# Patient Record
Sex: FEMALE | Race: WHITE | NOT HISPANIC OR LATINO | Employment: FULL TIME | URBAN - METROPOLITAN AREA
[De-identification: names, ages, dates, MRNs, and addresses within clinical notes are randomized per-mention and may not be internally consistent; named-entity substitution may affect disease eponyms.]

---

## 2017-01-20 ENCOUNTER — ALLSCRIPTS OFFICE VISIT (OUTPATIENT)
Dept: OTHER | Facility: OTHER | Age: 42
End: 2017-01-20

## 2018-01-06 ENCOUNTER — OFFICE VISIT (OUTPATIENT)
Dept: URGENT CARE | Facility: CLINIC | Age: 43
End: 2018-01-06
Payer: COMMERCIAL

## 2018-01-06 PROCEDURE — G0381 LEV 2 HOSP TYPE B ED VISIT: HCPCS

## 2018-01-07 NOTE — PROGRESS NOTES
Assessment   1  Low back pain (724 2) (M54 5)   2  Lumbar sprain, initial encounter (847 2) (S33 5XXA)    Plan   Lumbar sprain, initial encounter    · Celecoxib 200 MG Oral Capsule; TAKE 1 CAPSULE TWICE DAILY WITH FOOD   · Cyclobenzaprine HCl - 5 MG Oral Tablet; Take 1 tablet 3 times daily as needed   · Follow-up PRN Evaluation and Treatment  Follow-up  Status: Complete  Done:    18CXH0343 12:02PM   · Some eating tips that can help you lose weight ; Status:Complete;   Done: 73HWC8953    12:02PM   · We suggest that you use a lumbar support cushion when sitting or driving ;    Status:Complete;   Done: 84FUS3651 12:02PM   · You may slowly resume your normal level of activity once you feel better ;    Status:Complete;   Done: 21HFC6112 12:02PM    Discussion/Summary   Discussion Summary:    I recommend resting the back  Warm compresses  Mild home stretching exercises  Follow-up if no better in 3-5 days  Medication Side Effects Reviewed: Possible side effects of new medications were reviewed with the patient/guardian today  Counseling Documentation With Imm: The patient was counseled regarding instructions for management,-- impressions  Chief Complaint   1  Back Pain  Chief Complaint Free Text Note Form: Patient relates started with back pain x2 weeks ago after laying down on floor  Denies falling  Denies fever  Denies urinary symptoms  Today took Aleve 2 tabs at 9:30 am       History of Present Illness   HPI: Complains of significant low back pain in both sides  She said she had been exercising a week ago or so in injured her back but was getting better  This morning she woke up without pain stepped out of the shower and had a lot of pain in her low back  pain is both sides of her low back nonradiating  Hospital Based Practices Required Assessment: Reason DV Screen not done: family at bedside       Depression And Suicide Screen  Reason suicide screen not done: family at bedside        Prefered Language is english  Primary Language is  english  Review of Systems   Focused-Female:      Constitutional: No fever, no chills, feels well, no tiredness, no recent weight gain or loss  ENT: no ear ache, no loss of hearing, no nosebleeds or nasal discharge, no sore throat or hoarseness  Cardiovascular: no complaints of slow or fast heart rate, no chest pain, no palpitations, no leg claudication or lower extremity edema  Respiratory: no complaints of shortness of breath, no wheezing, no dyspnea on exertion, no orthopnea or PND  Gastrointestinal: no complaints of abdominal pain, no constipation, no nausea or diarrhea, no vomiting, no bloody stools  Musculoskeletal: as noted in HPI  Active Problems   1  Common migraine without aura (346 10) (G43 009)   2  Low back pain (724 2) (M54 5)   3  Migraine headache (346 90) (G43 909)   4  Obesity (278 00) (E66 9)    Past Medical History   1  History of headache (V13 89) (Z87 898)   2  History of migraine (V12 49) (Z86 69)  Active Problems And Past Medical History Reviewed: The active problems and past medical history were reviewed and updated today  Family History   Mother    1  Family history of Heart Disease (V17 49)   2  Family history of Hypertension (V17 49)  Father    3  Family history of Laryngeal Cancer (V16 2)  Family History    4  Family history of Cancer   5  Family history of Diabetes Mellitus (V18 0)    Social History    · Denied: History of Being A Social Drinker   · Caffeine Use   · Daily Coffee Consumption (1  Cups/Day)   · Denied: History of Drug Use   · Never A Smoker  Social History Reviewed: The social history was reviewed and updated today  Surgical History   1  History of Appendectomy   2  History of Sinus Surgery    Current Meds    1  SUMAtriptan Succinate 100 MG Oral Tablet; TAKE 1 TABLET AT ONSET OF MIGRAINE     HEADACHE  MAY REPEAT IN 2 HOURS IFNEEDED;      Therapy: 78XAQ5780 to (Evaluate:14Jan2018)  Requested for: 59RSZ0032; Last     Rx:16Oct2017 Ordered   2  Topiramate 100 MG Oral Tablet; take 2 tablets at bedtime; Therapy: 41BMG8694 to (Evaluate:16Nov2017)  Requested for: 20Jan2017; Last     Rx:20Jan2017 Ordered  Medication List Reviewed: The medication list was reviewed and updated today  Allergies   1  Penicillins    Vitals   Signs   Recorded: 61QJW3090 11:36AM   Temperature: 98 9 F, Tympanic  Heart Rate: 93  Respiration: 18  Systolic: 981, RUE, Sitting  Diastolic: 89, RUE, Sitting  Height: 5 ft 7 in  O2 Saturation: 98, RA  LMP: 21KQE7322  Pain Scale: 6    Physical Exam     Exam shows paravertebral muscle spasm bilateral low back  She has a fair range of motion neurovascular is intact  Straight leg raising to about 45Â° left 30Â° right  Leg lengths are equal   Muscle strength is normal          Constitutional      General appearance: No acute distress, well appearing and well nourished  Eyes      Conjunctiva and lids: No swelling, erythema or discharge  Pupils and irises: Equal, round and reactive to light  Ears, Nose, Mouth, and Throat      External inspection of ears and nose: Normal        Otoscopic examination: Tympanic membranes translucent with normal light reflex  Canals patent without erythema  Nasal mucosa, septum, and turbinates: Normal without edema or erythema  Oropharynx: Normal with no erythema, edema, exudate or lesions  Pulmonary      Respiratory effort: No increased work of breathing or signs of respiratory distress  Auscultation of lungs: Clear to auscultation  Cardiovascular      Palpation of heart: Normal PMI, no thrills  Auscultation of heart: Normal rate and rhythm, normal S1 and S2, without murmurs  Examination of extremities for edema and/or varicosities: Normal        Abdomen      Abdomen: Non-tender, no masses  Liver and spleen: No hepatomegaly or splenomegaly         Lymphatic      Palpation of lymph nodes in neck: No lymphadenopathy  Musculoskeletal      Gait and station: Normal        Digits and nails: Normal without clubbing or cyanosis  Inspection/palpation of joints, bones, and muscles: Normal        Skin      Skin and subcutaneous tissue: Normal without rashes or lesions  Neurologic      Cranial nerves: Cranial nerves 2-12 intact  Reflexes: 2+ and symmetric  Sensation: No sensory loss         Psychiatric      Orientation to person, place, and time: Normal        Mood and affect: Normal        Signatures    Electronically signed by : Idalia Cabezas DO; Jan 6 2018 12:03PM EST                       (Author)

## 2018-01-14 VITALS
HEART RATE: 82 BPM | DIASTOLIC BLOOD PRESSURE: 84 MMHG | RESPIRATION RATE: 16 BRPM | SYSTOLIC BLOOD PRESSURE: 129 MMHG | WEIGHT: 227.5 LBS | BODY MASS INDEX: 35.63 KG/M2

## 2018-01-14 NOTE — PROGRESS NOTES
Assessment    1  Common migraine without aura (346 10) (G43 009)   2  Obesity (278 00) (E66 9)    Plan  Common migraine without aura, Obesity    · Topiramate 100 MG Oral Tablet (Topamax); TAKE 1 TABLET AT BEDTIME   Rx By: Kayla Dennis; Dispense: 30 Days ; #:30 Tablet; Refill: 11; For: Common migraine without aura, Obesity; DARY = N; Verified Transmission to [id number] Last Updated By: System, [employer]; 1/22/2016 12:05:52 PM  Obesity    · Follow-up visit in 1 year Evaluation and Treatment  Follow-up  Status: Hold For -  Scheduling  Requested for: 46BHP3577   Ordered; For: Obesity; Ordered By: Kayla Dennis Performed:  Due: 24APR9111    Discussion/Summary  Discussion Summary:   Preventive:   - Will increase up to 100 mg  Abortive:   - Aleve as needed  Medication Side Effects Reviewed: Possible side effects of new medications were reviewed with the patient/guardian today  Patient Guardian understands agrees: The treatment plan was reviewed with the patient/guardian  The patient/guardian understands and agrees with the treatment plan      Chief Complaint  Chief Complaint Free Text Note Form: Pt is here for a f/u      History of Present Illness  HPI: We had the pleasure of evaluating Scott Dominguez in neurological follow up today  As you know she is a 47year old right handed female  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  What medications do you take or have you taken for your headaches? PREVENTIVE: Topamax  ABORTIVE: Imitrex  Headache trigger: Fatigue, Stress/Tension, Menstruation, Alcohol, missing meals, chewing,     How often do the headaches occur - rarely gets headaches now and exercising tends to help her significantly    What time of the day do the headaches start - Varies   How long do the headaches last - migraines- 1-2 days; mild headaches- 2hr with medication  Where are they located - Frontal, bilateral temples  What is the intensity of pain - migraines- up to 10/10; mild headaches- average pain level 4/10, up to 6/10  Any warning prior to headache and how long do they last - none  Describe your usual headache - migraines- Throbbing, Pounding; mild headaches- dull  Associated symptoms: photophobia, phonophobia, nausea, light-headed or dizzy, prefer to be alone and in a dark room, unable to work at time but had to leave work early  Headache are worse if the patient: bending over       Review of Systems  Neurological ROS:   Constitutional: recent weight gain  HEENT: sinus problems  Cardiovascular:  no chest pain or pressure, no palpitations present, the heart rate was not rapid or irregular, no swelling in the arms or legs, no poor circulation  Respiratory:  no unusual or persistant cough, no shortness of breath with or without exertion  Gastrointestinal:  no nausea, no vomiting, no diarrhea, no abdominal pain, no changes in bowel habits, no melena, no loss of bowel control  Genitourinary:  no incontinence, no feelings of urinary urgency, no increase in frequency, no urinary hesitancy, no dysuria, no hematuria  Musculoskeletal:  no arthralgias, no myalgias, no immobility or loss of function, no head/neck/back pain, no pain while walking  Integumentary  no masses, no rash, no skin lesions, no livedo reticularis  Psychiatric:  no anxiety, no depression, no mood swings, no psychiatric hospitalizations, no sleep problems  Endocrine  no unusual weight loss or gain, no excessive urination, no excessive thirst, no hair loss or gain, no hot or cold intolerance, no menstrual period change or irregularity, no loss of sexual ability or drive, no erection difficulty, no nipple discharge  Hematologic/Lymphatic:  no unusual bleeding, no tendency for easy bruising, no clotting skin or lumps     Neurological General:  no headache, no nausea or vomiting, no lightheadedness, no convulsions, no blackouts, no syncope, no trauma, no photopsia, no increased sleepiness, no trouble falling asleep, no snoring, no awakening at night  Neurological Mental Status:  no confusion, no mood swings, no alteration or loss of consciousness, no difficulty expressing/understanding speech, no memory problems  Neurological Cranial Nerves:  no blurry or double vision, no loss of vision, no face drooping, no facial numbness or weakness, no taste or smell loss/changes, no hearing loss or ringing, no vertigo or dizziness, no dysphagia, no slurred speech  Neurological Motor findings include:  no tremor, no twitching, no cramping(pre/post exercise), no atrophy  Neurological Coordination:  no unsteadiness, no vertigo or dizziness, no clumsiness, no problems reaching for objects  Neurological Sensory:  no numbness, no pain, no tingling, does not fall when eyes closed or taking a shower  Neurological Gait:  no difficulty walking, not falling to one side, no sensation of being pushed, has not had falls  Active Problems    1  Common migraine without aura (346 10) (G43 009)   2  Migraine headache (346 90) (G43 909)    Past Medical History    1  History of headache (V13 89) (Z87 898)   2  History of migraine (V12 49) (Z86 69)    Surgical History    1  History of Appendectomy   2  History of Sinus Surgery    Family History    1  Family history of Heart Disease (V17 49)   2  Family history of Hypertension (V17 49)    3  Family history of Laryngeal Cancer (V16 2)    4  Family history of Cancer   5  Family history of Diabetes Mellitus (V18 0)    Social History    · Denied: History of Being A Social Drinker   · Caffeine Use   · Daily Coffee Consumption (1  Cups/Day)   · Denied: History of Drug Use   · Never A Smoker    Current Meds   1  Magnesium Aspartate 65 MG Oral Tablet; Therapy: (Recorded:49Rfr9191) to Recorded   2  SUMAtriptan Succinate 100 MG Oral Tablet; TAKE 1 TABLET AT ONSET OF MIGRAINE   HEADACHE  MAY REPEAT IN 2 HOURS IF NEEDED;    Therapy: 55Swb4051 to (Evaluate:45Tzr2818) Requested for: 65ASN3492; Last   Rx:34Igu2702 Ordered   3  Topiramate 25 MG Oral Tablet; TAKE 1 TABLET BY MOUTH AT BEDTIME FOR 5 DAYS   THEN INCREASE TO TAKE 2 TABLETS AT BEDTIME THERE AFTER; Therapy: 30CNQ6086 to (Evaluate:10Mar2015)  Requested for: 51AIB1571; Last   Rx:10Nov2014 Ordered    Allergies    1  Penicillins    Vitals  Signs [Data Includes: Current Encounter]   Recorded: 94GYQ0883 11:26AM   Heart Rate: 80  Systolic: 511  Diastolic: 84  Weight: 373 lb   BMI Calculated: 35 24  BSA Calculated: 2 13  O2 Saturation: 99    Physical Exam    Constitutional   General appearance: No acute distress, well appearing and well nourished  Musculoskeletal   Gait and station: Normal gait, stance and balance  Muscle strength: Normal strength throughout  Muscle tone: No atrophy, abnormal movements, flaccidity, cogwheeling or spasticity      Neurologic   2nd cranial nerve: Normal     3rd, 4th, and 6th cranial nerves: Normal     5th cranial nerve: Normal     7th cranial nerve: Normal     8th cranial nerve: Normal     9th cranial nerve: Normal     11th cranial nerve: Normal     12th cranial nerve: Normal     Sensation: Normal     Reflexes: Normal     Coordination: Normal        Signatures   Electronically signed by : Shauna Hernandez MD; Jan 22 2016 12:12PM EST                       (Author)

## 2018-01-23 VITALS
HEIGHT: 67 IN | SYSTOLIC BLOOD PRESSURE: 150 MMHG | RESPIRATION RATE: 18 BRPM | DIASTOLIC BLOOD PRESSURE: 89 MMHG | OXYGEN SATURATION: 98 % | TEMPERATURE: 98.9 F | HEART RATE: 93 BPM

## 2019-07-08 RX ORDER — SUMATRIPTAN 100 MG/1
TABLET, FILM COATED ORAL
Qty: 12 TABLET | Refills: 2 | OUTPATIENT
Start: 2019-07-08

## 2019-07-15 ENCOUNTER — TELEPHONE (OUTPATIENT)
Dept: NEUROLOGY | Facility: CLINIC | Age: 44
End: 2019-07-15

## 2019-07-15 DIAGNOSIS — G43.009 MIGRAINE WITHOUT AURA AND WITHOUT STATUS MIGRAINOSUS, NOT INTRACTABLE: Primary | ICD-10-CM

## 2019-07-15 RX ORDER — SUMATRIPTAN 100 MG/1
1 TABLET, FILM COATED ORAL
COMMUNITY
Start: 2014-02-20 | End: 2019-07-15 | Stop reason: SDUPTHER

## 2019-07-15 RX ORDER — SUMATRIPTAN 100 MG/1
100 TABLET, FILM COATED ORAL AS NEEDED
Qty: 9 TABLET | Refills: 0 | Status: SHIPPED | OUTPATIENT
Start: 2019-07-15 | End: 2019-12-09 | Stop reason: SDUPTHER

## 2019-07-17 NOTE — TELEPHONE ENCOUNTER
Left voicemail for patient requesting call back ASAP to schedule follow  Did state in voicemail that will not initiate refill if no follow up appt

## 2019-11-19 ENCOUNTER — TELEPHONE (OUTPATIENT)
Dept: NEUROLOGY | Facility: CLINIC | Age: 44
End: 2019-11-19

## 2019-12-09 DIAGNOSIS — G43.009 MIGRAINE WITHOUT AURA AND WITHOUT STATUS MIGRAINOSUS, NOT INTRACTABLE: ICD-10-CM

## 2019-12-09 RX ORDER — SUMATRIPTAN 100 MG/1
100 TABLET, FILM COATED ORAL AS NEEDED
Qty: 9 TABLET | Refills: 0 | Status: SHIPPED | OUTPATIENT
Start: 2019-12-09

## 2019-12-10 DIAGNOSIS — G43.009 MIGRAINE WITHOUT AURA AND WITHOUT STATUS MIGRAINOSUS, NOT INTRACTABLE: ICD-10-CM

## 2019-12-10 RX ORDER — SUMATRIPTAN 100 MG/1
100 TABLET, FILM COATED ORAL AS NEEDED
Qty: 9 TABLET | Refills: 0 | Status: CANCELLED | OUTPATIENT
Start: 2019-12-10

## 2020-03-27 ENCOUNTER — TELEPHONE (OUTPATIENT)
Dept: NEUROLOGY | Facility: CLINIC | Age: 45
End: 2020-03-27

## 2020-03-27 NOTE — TELEPHONE ENCOUNTER
Call received from [facility]  Requesting physician information for appt 3/31/20 and required referral  She will call insurance to see what further is needed as provider is currently coming up as "out of network"

## 2021-02-14 ENCOUNTER — NURSE TRIAGE (OUTPATIENT)
Dept: OTHER | Facility: OTHER | Age: 46
End: 2021-02-14

## 2021-02-14 DIAGNOSIS — Z20.828 SARS-ASSOCIATED CORONAVIRUS EXPOSURE: ICD-10-CM

## 2021-02-14 DIAGNOSIS — Z20.828 SARS-ASSOCIATED CORONAVIRUS EXPOSURE: Primary | ICD-10-CM

## 2021-02-14 PROCEDURE — U0003 INFECTIOUS AGENT DETECTION BY NUCLEIC ACID (DNA OR RNA); SEVERE ACUTE RESPIRATORY SYNDROME CORONAVIRUS 2 (SARS-COV-2) (CORONAVIRUS DISEASE [COVID-19]), AMPLIFIED PROBE TECHNIQUE, MAKING USE OF HIGH THROUGHPUT TECHNOLOGIES AS DESCRIBED BY CMS-2020-01-R: HCPCS | Performed by: FAMILY MEDICINE

## 2021-02-14 PROCEDURE — U0005 INFEC AGEN DETEC AMPLI PROBE: HCPCS | Performed by: FAMILY MEDICINE

## 2021-02-14 NOTE — TELEPHONE ENCOUNTER
Reason for Disposition   [1] COVID-19 infection suspected by caller or triager AND [2] mild symptoms (cough, fever, or others) AND [7] no complications or SOB    Answer Assessment - Initial Assessment Questions  1  Were you within 6 feet or less, for up to 15 minutes or more with a person that has a confirmed COVID-19 test?   Yes    2  What was the date of your exposure? 1/7    3  Are you experiencing any symptoms attributed to the virus? (Assess for SOB, cough, fever, difficulty breathing)   Headache, cough, body aches, fatigue, runny nose, and fever  Temp-99 2 (oral)    4  HIGH RISK: Do you have any history heart or lung conditions, weakened immune system, diabetes, Asthma, CHF, HIV, COPD, Chemo, renal failure, sickle cell, etc?  Denies    5  PREGNANCY: Are you pregnant or did you recently give birth? Denies    Protocols used: CORONAVIRUS (COVID-19) DIAGNOSED OR SUSPECTED-ADULT-    Pt has an out of network PCP

## 2021-02-14 NOTE — TELEPHONE ENCOUNTER
Regarding: covid test request- symptomatic- cough  ----- Message from [facility] sent at 2/14/2021  9:58 AM EST -----  "I have a fever of 99 2 (oral), cough, headache, body aches, fatigue, runny nose   I have been in contact with a covid positive patient "

## 2021-02-15 LAB — SARS-COV-2 RNA RESP QL NAA+PROBE: POSITIVE

## 2021-02-16 ENCOUNTER — TELEPHONE (OUTPATIENT)
Dept: OTHER | Facility: OTHER | Age: 46
End: 2021-02-16

## 2021-02-16 NOTE — TELEPHONE ENCOUNTER
Your test for the novel coronavirus, also known as COVID-19, was positive  The sample showed that the virus was present  Positive COVID-19 test results are reportable to the [place] Department of Health  You may receive a call from trained public health staff to conduct an interview  It is important to answer their call  They will ask you to verify who you are  During the call they will ask you about what symptoms you have, what you did before you got sick, and who you were close to while sick  The health department does this to make sure everyone stays healthy and to reduce the spread of the virus  If you would like to verify if the caller does in fact work in contact tracing, call the [address] [place] [address] at [employer] ([phone])  For additional information, please visit the [facility] website: www health [email or web address] [email or web address]     If you have any additional questions, we can schedule a virtual visit for you with a provider or call the [facility] [phone], option [phone], for care advice    For additional information, please visit the Coronavirus FAQ on the [facility] home page ([email or web address])  Pt has an out of network PCP for follow up

## 2021-04-06 DIAGNOSIS — Z23 ENCOUNTER FOR IMMUNIZATION: ICD-10-CM

## 2021-04-19 ENCOUNTER — IMMUNIZATIONS (OUTPATIENT)
Dept: FAMILY MEDICINE CLINIC | Facility: HOSPITAL | Age: 46
End: 2021-04-19
Attending: INTERNAL MEDICINE

## 2021-04-19 DIAGNOSIS — Z23 ENCOUNTER FOR IMMUNIZATION: Primary | ICD-10-CM

## 2021-04-19 PROCEDURE — 91300 SARS-COV-2 / COVID-19 MRNA VACCINE (PFIZER-BIONTECH) 30 MCG: CPT

## 2021-04-19 PROCEDURE — 0001A SARS-COV-2 / COVID-19 MRNA VACCINE (PFIZER-BIONTECH) 30 MCG: CPT

## 2021-05-11 ENCOUNTER — IMMUNIZATIONS (OUTPATIENT)
Dept: FAMILY MEDICINE CLINIC | Facility: HOSPITAL | Age: 46
End: 2021-05-11

## 2021-05-11 DIAGNOSIS — Z23 ENCOUNTER FOR IMMUNIZATION: Primary | ICD-10-CM

## 2021-05-11 PROCEDURE — 0002A SARS-COV-2 / COVID-19 MRNA VACCINE (PFIZER-BIONTECH) 30 MCG: CPT

## 2021-05-11 PROCEDURE — 91300 SARS-COV-2 / COVID-19 MRNA VACCINE (PFIZER-BIONTECH) 30 MCG: CPT

## 2024-05-21 ENCOUNTER — OFFICE VISIT (OUTPATIENT)
Dept: URGENT CARE | Facility: CLINIC | Age: 49
End: 2024-05-21
Payer: COMMERCIAL

## 2024-05-21 VITALS
HEIGHT: 66 IN | TEMPERATURE: 97 F | SYSTOLIC BLOOD PRESSURE: 139 MMHG | RESPIRATION RATE: 18 BRPM | OXYGEN SATURATION: 96 % | HEART RATE: 81 BPM | WEIGHT: 216.4 LBS | BODY MASS INDEX: 34.78 KG/M2 | DIASTOLIC BLOOD PRESSURE: 88 MMHG
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
## 2024-05-21 PROCEDURE — 99213 OFFICE O/P EST LOW 20 MIN: CPT

## 2024-05-21 NOTE — PROGRESS NOTES
[facility]        NAME: Desiree Jones is a 48 y.o. female  : 1975    MRN: [id number]  DATE: May 21, 2024  TIME: 7:30 PM    Assessment and Plan   Bug bite, initial encounter [W57.XXXA]  1. Bug bite, initial encounter        Benadryl cream and hydrocortisone cream recommended. Discussed ED and return precautions with patient. Discussed that rash around bug bite does not appear to be a bullseye. Patient agreeable and understanding.   Patient Instructions     Follow up with PCP in 3-5 days.  Proceed to ER if symptoms worsen.    If tests have been performed at [facility], our office will contact you with results if changes need to be made to the care plan discussed with you at the visit.  You can review your full results on [facility].    Chief Complaint     Chief Complaint   Patient presents with    Rash     Rash to right arm and neck since       History of Present Illness       Patient is a 48-year-old female presenting complaining of bug bites x 3 days.  She states it started on  and has been itching and becoming more swollen.   She has been using hydrocortisone cream which helps the itching.   She notes she had it on her stomach before and it self resolved.  She denies any tick exposures that she knows of. States she was mulching this past weekend when she got the bites.    Rash  Pertinent negatives include no cough, fatigue, fever, shortness of breath or vomiting.       Review of Systems   Review of Systems   Constitutional:  Negative for chills, fatigue and fever.   HENT: Negative.     Respiratory:  Negative for cough, shortness of breath, wheezing and stridor.    Gastrointestinal:  Negative for abdominal pain, nausea and vomiting.   Genitourinary: Negative.    Musculoskeletal: Negative.  Negative for arthralgias and myalgias.   Skin:  Positive for rash.   Neurological:  Negative for seizures and syncope.         Current Medications       Current Outpatient Medications:      "losartan (COZAAR) 25 mg tablet, Take 50 mg by mouth daily, Disp: , Rfl:     SUMAtriptan (IMITREX) 100 mg tablet, Take 1 tablet (100 mg total) by mouth as needed for migraine, Disp: 9 tablet, Rfl: 0    topiramate (TOPAMAX) 100 mg tablet, Take 50 mg by mouth daily at bedtime (Patient not taking: Reported on 5/21/2024), Disp: , Rfl:     Current Allergies     Allergies as of 05/21/2024 - Reviewed 05/21/2024   Allergen Reaction Noted    Penicillins Edema 02/20/2014            The following portions of the patient's history were reviewed and updated as appropriate: allergies, current medications, past family history, past medical history, past social history, past surgical history and problem list.     History reviewed. No pertinent past medical history.    History reviewed. No pertinent surgical history.    History reviewed. No pertinent family history.      Medications have been verified.        Objective   /88   Pulse 81   Temp (!) 97 °F (36.1 °C) (Temporal)   Resp 18   Ht 5' 6\" (1.676 m)   Wt 98.2 kg (216 lb 6.4 oz)   SpO2 96%   BMI 34.93 kg/m²   No LMP recorded.       Physical Exam     Physical Exam  Vitals and nursing note reviewed.   Constitutional:       General: She is not in acute distress.     Appearance: Normal appearance. She is normal weight. She is not ill-appearing, toxic-appearing or diaphoretic.   HENT:      Head: Normocephalic and atraumatic.      Right Ear: External ear normal.      Left Ear: External ear normal.      Nose: Nose normal.      Mouth/Throat:      Mouth: Mucous membranes are moist.   Eyes:      Conjunctiva/sclera: Conjunctivae normal.   Cardiovascular:      Rate and Rhythm: Normal rate.      Pulses: Normal pulses.   Pulmonary:      Effort: Pulmonary effort is normal.      Breath sounds: Normal breath sounds.   Musculoskeletal:         General: No swelling or deformity. Normal range of motion.      Cervical back: Normal range of motion.   Skin:     General: Skin is warm and " dry.      Capillary Refill: Capillary refill takes less than 2 seconds.      Findings: Lesion (2cm erythematous papule on anterior right upper arm with surrounding 7cm of erythema) present.      Comments: 2 small 1cm papules on right side of neck     Neurological:      General: No focal deficit present.      Mental Status: She is alert. Mental status is at baseline.   Psychiatric:         Mood and Affect: Mood normal.         Behavior: Behavior normal.

## 2024-05-26 ENCOUNTER — OFFICE VISIT (OUTPATIENT)
Dept: URGENT CARE | Facility: CLINIC | Age: 49
End: 2024-05-26
Payer: COMMERCIAL

## 2024-05-26 ENCOUNTER — APPOINTMENT (OUTPATIENT)
Dept: URGENT CARE | Facility: CLINIC | Age: 49
End: 2024-05-26
Payer: COMMERCIAL

## 2024-05-26 ENCOUNTER — APPOINTMENT (OUTPATIENT)
Dept: RADIOLOGY | Facility: CLINIC | Age: 49
End: 2024-05-26
Payer: COMMERCIAL

## 2024-05-26 VITALS
TEMPERATURE: 97.4 F | DIASTOLIC BLOOD PRESSURE: 63 MMHG | BODY MASS INDEX: 32.95 KG/M2 | WEIGHT: 205 LBS | HEART RATE: 76 BPM | RESPIRATION RATE: 20 BRPM | HEIGHT: 66 IN | SYSTOLIC BLOOD PRESSURE: 114 MMHG | OXYGEN SATURATION: 97 %

## 2024-05-26 DIAGNOSIS — J40 BRONCHITIS: ICD-10-CM

## 2024-05-26 DIAGNOSIS — J40 BRONCHITIS: Primary | ICD-10-CM

## 2024-05-26 DIAGNOSIS — M94.0 COSTOCHONDRITIS: ICD-10-CM

## 2024-05-26 LAB
ATRIAL RATE: 69 BPM
P AXIS: 59 DEGREES
PR INTERVAL: 152 MS
QRS AXIS: 38 DEGREES
QRSD INTERVAL: 94 MS
QT INTERVAL: 372 MS
QTC INTERVAL: 398 MS
T WAVE AXIS: 11 DEGREES
VENTRICULAR RATE: 69 BPM

## 2024-05-26 PROCEDURE — 99213 OFFICE O/P EST LOW 20 MIN: CPT | Performed by: PHYSICIAN ASSISTANT

## 2024-05-26 PROCEDURE — 71046 X-RAY EXAM CHEST 2 VIEWS: CPT

## 2024-05-26 PROCEDURE — 93005 ELECTROCARDIOGRAM TRACING: CPT | Performed by: PHYSICIAN ASSISTANT

## 2024-05-26 PROCEDURE — 93010 ELECTROCARDIOGRAM REPORT: CPT | Performed by: STUDENT IN AN ORGANIZED HEALTH CARE EDUCATION/TRAINING PROGRAM

## 2024-05-26 RX ORDER — PREDNISONE 10 MG/1
10 TABLET ORAL DAILY
Qty: 21 TABLET | Refills: 0 | Status: SHIPPED | OUTPATIENT
Start: 2024-05-26

## 2024-05-26 RX ORDER — AZITHROMYCIN 250 MG/1
TABLET, FILM COATED ORAL
Qty: 6 TABLET | Refills: 0 | Status: SHIPPED | OUTPATIENT
Start: 2024-05-26 | End: 2024-05-30

## 2024-05-26 NOTE — PROGRESS NOTES
[facility]        NAME: Desiree Jones is a 48 y.o. female  : 1975    MRN: [id number]  DATE: May 26, 2024  TIME: 11:53 AM    Assessment and Plan   Bronchitis [J40]  1. Bronchitis  XR chest pa & lateral    ECG 12 lead    predniSONE 10 mg tablet    azithromycin (ZITHROMAX) 250 mg tablet      2. Costochondritis  predniSONE 10 mg tablet            Patient Instructions   Meds as prescribed.  Strict instruction to ER if they worsen.    Follow up with PCP in 3-5 days.  Proceed to  ER if symptoms worsen.    If tests have been performed at [facility], our office will contact you with results if changes need to be made to the care plan discussed with you at the visit.  [facility]    Chief Complaint     Chief Complaint   Patient presents with    Chest Pain     Started 15-20 mins ago, was cleaning then sat down, felt pain/pressure in sternum. Has been coughing for 1 month.          History of Present Illness       Patient is a 48-year-old female with significant past medical history of hypertension presents the office complaining of sudden onset of sternal chest pain described as 6 out of 10 tight and pressure.  States she was cleaning and sat down to rest when the pain started.  States she has been coughing for 1.5 months.  Denies associated fevers, chills, congestion, shortness of breath, wheezing, nausea, vomiting, jaw pain, left arm pain, numbness and tingling, or change in bowel habits.  She was seen for cough 2 weeks ago and given prednisone and cough medicine which helped mildly.  Patient has family history of early MI.        Review of Systems   Review of Systems   Constitutional:  Negative for chills and fever.   HENT:  Negative for congestion and sore throat.    Eyes:  Negative for photophobia and visual disturbance.   Respiratory:  Positive for cough and chest tightness. Negative for shortness of breath and wheezing.    Cardiovascular:  Positive for  "chest pain. Negative for palpitations and leg swelling.   Gastrointestinal:  Negative for abdominal pain, diarrhea, nausea and vomiting.   Neurological:  Positive for light-headedness. Negative for dizziness, syncope, numbness and headaches.         Current Medications       Current Outpatient Medications:     azithromycin (ZITHROMAX) 250 mg tablet, Take 2 tablets today then 1 tablet daily x 4 days, Disp: 6 tablet, Rfl: 0    losartan (COZAAR) 25 mg tablet, Take 50 mg by mouth daily, Disp: , Rfl:     predniSONE 10 mg tablet, Take 1 tablet (10 mg total) by mouth daily Take 6 on day 1, take 5 on day 2, take 4 on day 3, take 3 on day 4, take 2 on day 5, take 1 on day 6., Disp: 21 tablet, Rfl: 0    SUMAtriptan (IMITREX) 100 mg tablet, Take 1 tablet (100 mg total) by mouth as needed for migraine, Disp: 9 tablet, Rfl: 0    topiramate (TOPAMAX) 100 mg tablet, Take 50 mg by mouth daily at bedtime (Patient not taking: Reported on 5/21/2024), Disp: , Rfl:     Current Allergies     Allergies as of 05/26/2024 - Reviewed 05/26/2024   Allergen Reaction Noted    Penicillins Edema 02/20/2014            The following portions of the patient's history were reviewed and updated as appropriate: allergies, current medications, past family history, past medical history, past social history, past surgical history and problem list.     Past Medical History:   Diagnosis Date    HTN (hypertension)     Migraines        Past Surgical History:   Procedure Laterality Date    ADENOIDECTOMY         No family history on file.      Medications have been verified.        Objective   /63   Pulse 76   Temp (!) 97.4 °F (36.3 °C)   Resp 20   Ht 5' 6\" (1.676 m)   Wt 93 kg (205 lb)   LMP 05/15/2024   SpO2 97%   BMI 33.09 kg/m²   Patient's last menstrual period was 05/15/2024.       Physical Exam     Physical Exam  Vitals and nursing note reviewed.   Constitutional:       Appearance: Normal appearance. She is well-developed.   HENT:      Head: " Normocephalic and atraumatic.      Right Ear: Tympanic membrane, ear canal and external ear normal.      Left Ear: Tympanic membrane, ear canal and external ear normal.      Nose: Nose normal.      Mouth/Throat:      Pharynx: Uvula midline.   Eyes:      General: Lids are normal.      Conjunctiva/sclera: Conjunctivae normal.      Pupils: Pupils are equal, round, and reactive to light.   Cardiovascular:      Rate and Rhythm: Normal rate and regular rhythm.      Pulses: Normal pulses.      Heart sounds: Normal heart sounds. No murmur heard.     No friction rub. No gallop.   Pulmonary:      Effort: Pulmonary effort is normal.      Breath sounds: Normal breath sounds. No wheezing, rhonchi or rales.   Chest:      Chest wall: Tenderness present.       Abdominal:      General: Bowel sounds are normal.      Palpations: Abdomen is soft.      Tenderness: There is no abdominal tenderness.   Musculoskeletal:         General: Normal range of motion.      Cervical back: Neck supple.   Lymphadenopathy:      Cervical: No cervical adenopathy.   Skin:     General: Skin is warm and dry.      Capillary Refill: Capillary refill takes less than 2 seconds.   Neurological:      Mental Status: She is alert.   Psychiatric:         Mood and Affect: Mood is anxious.      Comments: Peers anxious         Chest x-ray: WNL    EKG: NSR. No ANTHONY or T wave depression.